# Patient Record
Sex: FEMALE | Race: BLACK OR AFRICAN AMERICAN | NOT HISPANIC OR LATINO | Employment: OTHER | URBAN - METROPOLITAN AREA
[De-identification: names, ages, dates, MRNs, and addresses within clinical notes are randomized per-mention and may not be internally consistent; named-entity substitution may affect disease eponyms.]

---

## 2017-08-08 PROBLEM — D06.9 CIN III (CERVICAL INTRAEPITHELIAL NEOPLASIA GRADE III) WITH SEVERE DYSPLASIA: Status: ACTIVE | Noted: 2017-08-08

## 2017-08-09 PROBLEM — Z98.890 S/P CONIZATION OF CERVIX: Status: ACTIVE | Noted: 2017-08-09

## 2017-08-22 PROBLEM — N72 CERVICITIS: Status: ACTIVE | Noted: 2017-08-22

## 2017-08-22 PROBLEM — N95.0 POSTMENOPAUSAL VAGINAL BLEEDING: Status: ACTIVE | Noted: 2017-08-22

## 2018-07-03 PROBLEM — Z01.419 WELL WOMAN EXAM: Status: ACTIVE | Noted: 2018-07-03

## 2022-05-13 ENCOUNTER — PATIENT OUTREACH (OUTPATIENT)
Dept: ADMINISTRATIVE | Facility: HOSPITAL | Age: 67
End: 2022-05-13

## 2022-06-19 ENCOUNTER — HOSPITAL ENCOUNTER (EMERGENCY)
Facility: HOSPITAL | Age: 67
Discharge: HOME | End: 2022-06-19
Attending: STUDENT IN AN ORGANIZED HEALTH CARE EDUCATION/TRAINING PROGRAM
Payer: MEDICARE

## 2022-06-19 VITALS
RESPIRATION RATE: 20 BRPM | BODY MASS INDEX: 20.06 KG/M2 | DIASTOLIC BLOOD PRESSURE: 68 MMHG | HEART RATE: 99 BPM | OXYGEN SATURATION: 95 % | TEMPERATURE: 97 F | SYSTOLIC BLOOD PRESSURE: 152 MMHG | WEIGHT: 128.06 LBS

## 2022-06-19 DIAGNOSIS — B34.9 VIRAL SYNDROME: Primary | ICD-10-CM

## 2022-06-19 LAB
GROUP A STREP, MOLECULAR: NEGATIVE
INFLUENZA A, MOLECULAR: NEGATIVE
INFLUENZA B, MOLECULAR: NEGATIVE
SARS-COV-2 RDRP RESP QL NAA+PROBE: NEGATIVE
SPECIMEN SOURCE: NORMAL

## 2022-06-19 PROCEDURE — 87502 INFLUENZA DNA AMP PROBE: CPT | Performed by: STUDENT IN AN ORGANIZED HEALTH CARE EDUCATION/TRAINING PROGRAM

## 2022-06-19 PROCEDURE — 96372 THER/PROPH/DIAG INJ SC/IM: CPT | Performed by: STUDENT IN AN ORGANIZED HEALTH CARE EDUCATION/TRAINING PROGRAM

## 2022-06-19 PROCEDURE — 87651 STREP A DNA AMP PROBE: CPT | Performed by: STUDENT IN AN ORGANIZED HEALTH CARE EDUCATION/TRAINING PROGRAM

## 2022-06-19 PROCEDURE — 99284 EMERGENCY DEPT VISIT MOD MDM: CPT | Mod: 25

## 2022-06-19 PROCEDURE — U0002 COVID-19 LAB TEST NON-CDC: HCPCS | Performed by: STUDENT IN AN ORGANIZED HEALTH CARE EDUCATION/TRAINING PROGRAM

## 2022-06-19 PROCEDURE — 63600175 PHARM REV CODE 636 W HCPCS: Performed by: STUDENT IN AN ORGANIZED HEALTH CARE EDUCATION/TRAINING PROGRAM

## 2022-06-19 RX ORDER — DEXAMETHASONE SODIUM PHOSPHATE 4 MG/ML
8 INJECTION, SOLUTION INTRA-ARTICULAR; INTRALESIONAL; INTRAMUSCULAR; INTRAVENOUS; SOFT TISSUE
Status: COMPLETED | OUTPATIENT
Start: 2022-06-19 | End: 2022-06-19

## 2022-06-19 RX ADMIN — DEXAMETHASONE SODIUM PHOSPHATE 8 MG: 4 INJECTION, SOLUTION INTRA-ARTICULAR; INTRALESIONAL; INTRAMUSCULAR; INTRAVENOUS; SOFT TISSUE at 11:06

## 2022-06-19 NOTE — ED PROVIDER NOTES
Encounter Date: 6/19/2022       History     Chief Complaint   Patient presents with    General Illness     66-year-old female with history of diabetes and hypertension presenting with three days of cough, congestion, body aches.  Denies fever, shortness of breath, chest pain.  No other complaints.        Review of patient's allergies indicates:   Allergen Reactions    Fig Rash     Past Medical History:   Diagnosis Date    Abnormal Pap smear of cervix 4/16/2015    ASCUS ,  Positive HPV    Arthritis     Cervicitis     CLAUDIA III (cervical intraepithelial neoplasia grade III) with severe dysplasia 8/8/2017    Diabetes mellitus     Diabetes mellitus type I     History of shingles     HPV test positive 5/24/2016    Hypertension     Toothache      Past Surgical History:   Procedure Laterality Date    CERVICAL CONIZATION N/A 08/09/2017    CERVIX LESION DESTRUCTION  08/09/2017    COLPOSCOPY  06/05/2015    MANDIBLE FRACTURE SURGERY Bilateral     TUBAL LIGATION       Family History   Problem Relation Age of Onset    Diabetes Mother     Hypertension Mother     Mental illness Mother     Cancer Father         colon cancer    Dementia Father      Social History     Tobacco Use    Smoking status: Never Smoker    Smokeless tobacco: Never Used   Substance Use Topics    Alcohol use: Yes     Comment: weekends drinks beer    Drug use: No     Review of Systems   Constitutional: Negative for fever.   HENT: Positive for congestion. Negative for sore throat.    Respiratory: Positive for cough. Negative for shortness of breath.    Cardiovascular: Negative for chest pain.   Gastrointestinal: Negative for abdominal pain, diarrhea, nausea and vomiting.   Genitourinary: Negative for dysuria.   Musculoskeletal: Positive for myalgias. Negative for back pain.   Skin: Negative for rash.   Neurological: Negative for weakness.   Hematological: Does not bruise/bleed easily.       Physical Exam     Initial Vitals [06/19/22 1053]    BP Pulse Resp Temp SpO2   (!) 152/68 99 20 97.3 °F (36.3 °C) 95 %      MAP       --         Physical Exam    Nursing note and vitals reviewed.  Constitutional: She appears well-developed.   HENT:   Head: Normocephalic.   Eyes: Pupils are equal, round, and reactive to light.   Neck:   Normal range of motion.  Cardiovascular:   No murmur heard.  Pulmonary/Chest: No respiratory distress.   Clear lungs bilaterally.  No respiratory distress.  No wheezing or rales.  Good air movement.     Abdominal: Abdomen is soft.   Musculoskeletal:         General: No edema.      Cervical back: Normal range of motion.     Neurological: She is alert.   Skin: Skin is warm.   Psychiatric: She has a normal mood and affect.         ED Course   Procedures  Labs Reviewed   INFLUENZA A & B BY MOLECULAR   GROUP A STREP, MOLECULAR   SARS-COV-2 RNA AMPLIFICATION, QUAL          Imaging Results    None          Medications - No data to display  Medical Decision Making:   Differential Diagnosis:   DDX:  Patient presenting with symptoms of an upper respiratory virus.  Concern for COVID, especially given recent surges in the current pandemic.  Unlikely pneumonia based on history, exam, vitals.  Do not suspect OM given sxs.  DX:  COVID.  Influenza.  Strep.  TX: Analgesia PRN. No indication for antibiotics.  Given patient is not hypoxic, no indications for steroids this time.  Dispo: Discharge to follow up with PMD within 3-5 days with precautions for RTED and supportive care recommendations.                        Clinical Impression:   Final diagnoses:  [B34.9] Viral syndrome (Primary)                 Jeremiah Davies MD  06/19/22 1051

## 2025-03-25 ENCOUNTER — TELEPHONE (OUTPATIENT)
Dept: TRANSPLANT | Facility: CLINIC | Age: 70
End: 2025-03-25
Payer: MEDICARE

## 2025-03-25 DIAGNOSIS — I50.9 CONGESTIVE HEART FAILURE, UNSPECIFIED HF CHRONICITY, UNSPECIFIED HEART FAILURE TYPE: Primary | ICD-10-CM

## 2025-05-14 ENCOUNTER — TELEPHONE (OUTPATIENT)
Dept: TRANSPLANT | Facility: CLINIC | Age: 70
End: 2025-05-14
Payer: MEDICARE

## 2025-05-14 NOTE — TELEPHONE ENCOUNTER
Called Patient to remind him//her of their appointment with Dr Weston on Friday 5/16/25 with/without tests. Patient confirmed.

## 2025-05-16 ENCOUNTER — OFFICE VISIT (OUTPATIENT)
Dept: TRANSPLANT | Facility: CLINIC | Age: 70
End: 2025-05-16
Payer: MEDICARE

## 2025-05-16 ENCOUNTER — HOSPITAL ENCOUNTER (OUTPATIENT)
Dept: CARDIOLOGY | Facility: HOSPITAL | Age: 70
Discharge: HOME | End: 2025-05-16
Attending: INTERNAL MEDICINE
Payer: MEDICARE

## 2025-05-16 VITALS
HEART RATE: 84 BPM | DIASTOLIC BLOOD PRESSURE: 76 MMHG | BODY MASS INDEX: 20.9 KG/M2 | HEIGHT: 67 IN | WEIGHT: 133.19 LBS | SYSTOLIC BLOOD PRESSURE: 136 MMHG

## 2025-05-16 VITALS
SYSTOLIC BLOOD PRESSURE: 141 MMHG | WEIGHT: 132.25 LBS | HEART RATE: 83 BPM | BODY MASS INDEX: 20.76 KG/M2 | DIASTOLIC BLOOD PRESSURE: 76 MMHG | HEIGHT: 67 IN

## 2025-05-16 DIAGNOSIS — I10 PRIMARY HYPERTENSION: Chronic | ICD-10-CM

## 2025-05-16 DIAGNOSIS — I50.9 CONGESTIVE HEART FAILURE, UNSPECIFIED HF CHRONICITY, UNSPECIFIED HEART FAILURE TYPE: ICD-10-CM

## 2025-05-16 DIAGNOSIS — I50.9 CONGESTIVE HEART FAILURE, UNSPECIFIED HF CHRONICITY, UNSPECIFIED HEART FAILURE TYPE: Primary | ICD-10-CM

## 2025-05-16 DIAGNOSIS — I50.42 CHRONIC COMBINED SYSTOLIC AND DIASTOLIC HEART FAILURE: ICD-10-CM

## 2025-05-16 LAB
AORTIC SIZE INDEX (SOV): 1.9 CM/M2
AORTIC SIZE INDEX: 1.6 CM/M2
ASCENDING AORTA: 2.7 CM
AV AREA BY CONTINUOUS VTI: 2.1 CM2
AV INDEX (PROSTH): 0.67
AV LVOT MEAN GRADIENT: 1 MMHG
AV LVOT PEAK GRADIENT: 1 MMHG
AV MEAN GRADIENT: 2 MMHG
AV PEAK GRADIENT: 3 MMHG
AV VALVE AREA BY VELOCITY RATIO: 1.7 CM²
AV VALVE AREA: 2.1 CM2
AV VELOCITY RATIO: 0.56
BSA FOR ECHO PROCEDURE: 1.68 M2
CV ECHO LV RWT: 0.29 CM
DOP CALC AO PEAK VEL: 0.9 M/S
DOP CALC AO VTI: 18 CM
DOP CALC LVOT AREA: 3.1 CM2
DOP CALC LVOT DIAMETER: 2 CM
DOP CALC LVOT PEAK VEL: 0.5 M/S
DOP CALC LVOT STROKE VOLUME: 37.7 CM3
DOP CALCLVOT PEAK VEL VTI: 12 CM
E WAVE DECELERATION TIME: 151 MS
E/A RATIO: 0.88
E/E' RATIO: 22 M/S
ECHO EF ESTIMATED: 22 %
ECHO LV POSTERIOR WALL: 0.9 CM (ref 0.6–1.1)
FRACTIONAL SHORTENING: 22.6 % (ref 28–44)
INTERVENTRICULAR SEPTUM: 1.1 CM (ref 0.6–1.1)
IVC DIAMETER: 1.66 CM
LA MAJOR: 6 CM
LA MINOR: 5.6 CM
LA WIDTH: 4.6 CM
LEFT ATRIUM SIZE: 4.4 CM
LEFT ATRIUM VOLUME INDEX MOD: 46 ML/M2
LEFT ATRIUM VOLUME INDEX: 59 ML/M2
LEFT ATRIUM VOLUME MOD: 78 ML
LEFT ATRIUM VOLUME: 100 CM3
LEFT INTERNAL DIMENSION IN SYSTOLE: 4.8 CM (ref 2.1–4)
LEFT VENTRICLE DIASTOLIC VOLUME INDEX: 81.07 ML/M2
LEFT VENTRICLE DIASTOLIC VOLUME: 137 ML
LEFT VENTRICLE MASS INDEX: 154.5 G/M2
LEFT VENTRICLE SYSTOLIC VOLUME INDEX: 62.7 ML/M2
LEFT VENTRICLE SYSTOLIC VOLUME: 106 ML
LEFT VENTRICULAR INTERNAL DIMENSION IN DIASTOLE: 6.2 CM (ref 3.5–6)
LEFT VENTRICULAR MASS: 261 G
LV LATERAL E/E' RATIO: 17.8
LV SEPTAL E/E' RATIO: 29.7
Lab: 18 %
MV PEAK A VEL: 1.01 M/S
MV PEAK E VEL: 0.89 M/S
OHS CV RV/LV RATIO: 0.56 CM
OHS LV EJECTION FRACTION SIMPSONS BIPLANE MOD: 25 %
PISA TR MAX VEL: 3.3 M/S
RA MAJOR: 5.83 CM
RA PRESSURE ESTIMATED: 3 MMHG
RA WIDTH: 4.54 CM
RIGHT ATRIAL AREA: 22.6 CM2
RIGHT VENTRICLE DIASTOLIC BASEL DIMENSION: 3.5 CM
RIGHT VENTRICLE FREE WALL STRAIN: 10 %
RIGHT VENTRICLE GLOBAL SYSTOLIC STRAIN: 10 %
RV FRACTIONAL AREA CHANGE: 18 %
RV TB RVSP: 6 MMHG
RV TISSUE DOPPLER FREE WALL SYSTOLIC VELOCITY 1 (APICAL 4 CHAMBER VIEW): 7.76 CM/S
SINUS: 3.13 CM
STJ: 2.3 CM
TDI LATERAL: 0.05 M/S
TDI SEPTAL: 0.03 M/S
TDI: 0.04 M/S
TRICUSPID ANNULAR PLANE SYSTOLIC EXCURSION: 0.9 CM
TV PEAK GRADIENT: 43 MMHG
TV REST PULMONARY ARTERY PRESSURE: 47 MMHG
Z-SCORE OF LEFT VENTRICULAR DIMENSION IN END DIASTOLE: 2.76
Z-SCORE OF LEFT VENTRICULAR DIMENSION IN END SYSTOLE: 3.94

## 2025-05-16 PROCEDURE — 93306 TTE W/DOPPLER COMPLETE: CPT | Mod: 26,,, | Performed by: INTERNAL MEDICINE

## 2025-05-16 PROCEDURE — 93306 TTE W/DOPPLER COMPLETE: CPT

## 2025-05-16 PROCEDURE — 99999 PR PBB SHADOW E&M-EST. PATIENT-LVL IV: CPT | Mod: PBBFAC,,, | Performed by: INTERNAL MEDICINE

## 2025-05-16 RX ORDER — TIRZEPATIDE 2.5 MG/.5ML
INJECTION, SOLUTION SUBCUTANEOUS
COMMUNITY

## 2025-05-16 RX ORDER — SACUBITRIL AND VALSARTAN 49; 51 MG/1; MG/1
1 TABLET, FILM COATED ORAL 2 TIMES DAILY
Qty: 60 TABLET | Refills: 5 | Status: SHIPPED | OUTPATIENT
Start: 2025-05-16

## 2025-05-16 RX ORDER — SACUBITRIL AND VALSARTAN 24; 26 MG/1; MG/1
1 TABLET, FILM COATED ORAL 2 TIMES DAILY
COMMUNITY
Start: 2025-01-07 | End: 2025-05-16

## 2025-05-16 RX ORDER — FAMOTIDINE 20 MG/1
20 TABLET, FILM COATED ORAL 2 TIMES DAILY
COMMUNITY
Start: 2024-12-30

## 2025-05-16 RX ORDER — METOPROLOL SUCCINATE 100 MG/1
100 TABLET, EXTENDED RELEASE ORAL 2 TIMES DAILY
COMMUNITY
Start: 2025-05-13 | End: 2025-05-16

## 2025-05-16 RX ORDER — FUROSEMIDE 20 MG/1
20 TABLET ORAL EVERY OTHER DAY
Qty: 30 TABLET | Refills: 11 | Status: SHIPPED | OUTPATIENT
Start: 2025-05-16 | End: 2027-05-06

## 2025-05-16 RX ORDER — METOPROLOL SUCCINATE 200 MG/1
200 TABLET, EXTENDED RELEASE ORAL DAILY
Qty: 90 TABLET | Refills: 3 | Status: SHIPPED | OUTPATIENT
Start: 2025-05-16 | End: 2026-05-16

## 2025-05-16 RX ORDER — SPIRONOLACTONE 25 MG/1
25 TABLET ORAL DAILY
Qty: 30 TABLET | Refills: 5 | Status: SHIPPED | OUTPATIENT
Start: 2025-05-16

## 2025-05-16 RX ORDER — PRAVASTATIN SODIUM 40 MG/1
40 TABLET ORAL NIGHTLY
COMMUNITY

## 2025-05-16 RX ORDER — CHOLECALCIFEROL (VITAMIN D3) 25 MCG
1000 TABLET ORAL DAILY
COMMUNITY

## 2025-05-16 RX ORDER — SPIRONOLACTONE 25 MG/1
12.5 TABLET ORAL DAILY
COMMUNITY
Start: 2025-03-14 | End: 2025-05-16 | Stop reason: SDUPTHER

## 2025-05-16 RX ORDER — BUMETANIDE 1 MG/1
1 TABLET ORAL
COMMUNITY
Start: 2025-02-28 | End: 2025-05-16

## 2025-05-16 NOTE — PATIENT INSTRUCTIONS
You have extra fluid on you.  Please adhere to a low sodium diet (no more than 1.5 grams of sodium in 24h).  3.   Follow fluid restriction of  1. no more than 2 liters in 24 hours..   4.  Increase spironolactone from 12.5 to 25 mg daily.  5. Start lasix 20 mg every other day.  6. F/u blood test Wednesday am.  8. In 2 weeks from now, increase entresto from low dose to medium dose.  9. Call us every 2 weeks for next upgrade on medications.  10. Metoprolol: take 200 mg daily instead of 100 mg BID.  11. F/u in 3 months with labs.

## 2025-05-16 NOTE — PROGRESS NOTES
[facility] Follow up.        Attending Physician: Watson Pa MD.  The patient's last visit with me was on Visit date not found.         HPI.  70 yo woman with PMH/o:  -HTN  -Diabetes on insulin x 15 years, but taken off of it. Currently on mounjaro.  -dyslipidemia on pravastatin  -Neuropathy and RLS    No history of CAD. Reportedly she underwent LHC in OSH that found no CAD.    SH  Never tobacco, alcohol mild intake on special days. No illicit drugs.  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  Lives by herself with her dog.    Last year she had 5 deaths in the family. That is when her heart broke and started having dyspnea, swelling (December 2024). She lost her son.    Today she got very tearful after remembering the dead of her son.           Review of Systems   Constitutional:  Positive for activity change and fatigue. Negative for chills, diaphoresis, fever and unexpected weight change.   HENT:  Negative for nasal congestion, rhinorrhea and sore throat.    Eyes:  Negative for visual disturbance.   Respiratory:  Positive for shortness of breath.    Cardiovascular:  Negative for chest pain and leg swelling.   Gastrointestinal:  Negative for abdominal distention, abdominal pain, diarrhea, nausea and vomiting.   Genitourinary:  Negative for difficulty urinating, dysuria and hematuria.   Integumentary:  Negative for rash.   Neurological:  Negative for seizures, syncope and light-headedness.   Psychiatric/Behavioral:  Negative for agitation and hallucinations.         Past Medical History:   Diagnosis Date    Abnormal Pap smear of cervix 4/16/2015    ASCUS ,  Positive HPV    Arthritis     Cervicitis     CLAUDIA III (cervical intraepithelial neoplasia grade III) with severe dysplasia 8/8/2017    Diabetes mellitus     Diabetes mellitus type I     History  "of shingles     HPV test positive 2016    Hypertension     Toothache         Past Surgical History:   Procedure Laterality Date    CERVICAL CONIZATION N/A 2017    CERVIX LESION DESTRUCTION  2017    COLPOSCOPY  2015    MANDIBLE FRACTURE SURGERY Bilateral     TUBAL LIGATION          Family History   Problem Relation Name Age of Onset    Diabetes Mother      Hypertension Mother      Mental illness Mother      Cancer Father          colon cancer    Dementia Father          Review of patient's allergies indicates:   Allergen Reactions    Fig Rash        Current Outpatient Medications   Medication Instructions    aspirin (ECOTRIN) 81 mg, Daily    BD ULTRA-FINE MINI PEN NEEDLE 31 gauge x 3/16" Ndle use as directed    bumetanide (BUMEX) 1 mg, As needed (PRN)    ENTRESTO 24-26 mg per tablet 1 tablet, 2 times daily    famotidine (PEPCID) 20 mg, 2 times daily    GVOKE HYPOPEN 2-PACK 1 mg/0.2 mL AtIn INJECT 1MG SUB-Q ONCE FOR HYPOGLYCEMIC SYMPTOMS AS A SINGLE DOSE. MAY REPEAT ONCE AFTER 15 MINUTES IF NO RESPONSE    loratadine (CLARITIN) 10 mg, Daily    metoprolol succinate (TOPROL-XL) 100 mg, 2 times daily    MOUNJARO 2.5 mg/0.5 mL PnIj SMARTSI.5 Milligram(s) SUB-Q Once a Week    pravastatin (PRAVACHOL) 40 mg, Nightly    spironolactone (ALDACTONE) 12.5 mg, Daily    TRESIBA FLEXTOUCH U-100 100 unit/mL (3 mL) insulin pen No dose, route, or frequency recorded.    vitamin D (VITAMIN D3) 1,000 Units, Daily        Vitals:    25 0917   BP: 136/76   Pulse: 84        Wt Readings from Last 3 Encounters:   25 60.4 kg (133 lb 2.5 oz)   25 60 kg (132 lb 4.4 oz)   24 60.2 kg (132 lb 11.5 oz)     Temp Readings from Last 3 Encounters:   24 97 °F (36.1 °C) (Temporal)   22 97.7 °F (36.5 °C) (Oral)   22 97.5 °F (36.4 °C) (Oral)     BP Readings from Last 3 Encounters:   25 136/76   25 (!) 141/76   24 (!) 150/67     Pulse Readings from Last 3 Encounters: " "  05/16/25 84   05/16/25 83   07/30/24 69        Body mass index is 20.86 kg/m². Estimated body surface area is 1.69 meters squared as calculated from the following:    Height as of this encounter: 5' 7" (1.702 m).    Weight as of this encounter: 60.4 kg (133 lb 2.5 oz).     Physical Exam  Constitutional:       Appearance: She is well-developed.   HENT:      Head: Normocephalic and atraumatic.      Right Ear: External ear normal.      Left Ear: External ear normal.   Eyes:      Conjunctiva/sclera: Conjunctivae normal.      Pupils: Pupils are equal, round, and reactive to light.   Neck:      Vascular: Hepatojugular reflux and JVD present.      Comments: JVP 15 cmH20  Cardiovascular:      Rate and Rhythm: Normal rate and regular rhythm.      Pulses: Intact distal pulses.      Heart sounds: S1 normal and S2 normal. No murmur heard.     No friction rub. No gallop.   Pulmonary:      Effort: Pulmonary effort is normal.      Breath sounds: Normal breath sounds.   Abdominal:      General: Bowel sounds are normal. There is no distension.      Palpations: Abdomen is soft.      Tenderness: There is no abdominal tenderness. There is no guarding or rebound.   Musculoskeletal:      Cervical back: Normal range of motion and neck supple.      Right lower leg: No edema.      Left lower leg: No edema.   Neurological:      Mental Status: She is alert and oriented to person, place, and time.          Lab Results   Component Value Date    BNP 3,117 (H) 05/16/2025     08/09/2017    K 3.6 08/09/2017     08/09/2017    CO2 26 08/09/2017    BUN 14 08/09/2017    CREATININE 0.7 08/09/2017     (H) 08/09/2017    HGBA1C 9.8 (H) 07/03/2018    WBC 15.23 (H) 05/16/2025    HGB 14.9 05/16/2025    HGB 13.3 07/18/2017    HCT 51.2 (H) 05/16/2025    HCT 42.4 07/18/2017     (H) 05/16/2025     (H) 07/18/2017    CHOL 158 07/03/2018    HDL 32 (L) 07/03/2018    LDLCALC 95.6 07/03/2018    TRIG 152 (H) 07/03/2018 "           Assessment and Plan:  There are no diagnoses linked to this encounter.      Chronic systolic HF, NYHA class III, stage C. LVEF 25-30%.  Etiology: NICM, negative LHC. Hypertensive heart disease vs idiopathic.  Devices: none, she was carrying a lifevest.  Medications: low dose sacubitril-valsartan, metoprolol succinate 100 mg BID, spironolactone 12.5 mg daily.  Hemodynamic status: warm, normotensive, hypervolemic.  Clinical course: new diagnosis, still in the process of uptitrating her medical regimen.  Plan:  -patient to adhere to a fluid restriction of NMT 1.5 liters/24h.  -patient to adhere to  low sodium diet, NMT 1.5 grams of sodium in a day.  -patient was asked to weight himself every day and to keep daily record of his weights.  -Start lasix 20 mg every other day.  -Increase spironolactone from 12.5 to 25 mg daily.   -F/u blood test Wednesday am.  -In 2 weeks from now, increase entresto from low dose to medium dose.  -patient was asked to call us every 2 weeks for next upgrade on medications.  -Metoprolol: switched to 200 mg daily instead of 100 mg BID.    F/u in 3 months with labs.

## 2025-05-21 ENCOUNTER — TELEPHONE (OUTPATIENT)
Dept: TRANSPLANT | Facility: CLINIC | Age: 70
End: 2025-05-21
Payer: MEDICARE

## 2025-05-21 NOTE — TELEPHONE ENCOUNTER
5/21/25  5:10 pm: F/U on todays lab remind me  Below is from Dr. Galdamez 5/16 clinic note w/ indication for lab today:  4.  Increase spironolactone from 12.5 to 25 mg daily.  5. Start lasix 20 mg every other day.  6. F/u blood test Wednesday am.  8. In 2 weeks from now, increase entresto from low dose to medium dose.  9. Call us every 2 weeks for next upgrade on medications.  10. Metoprolol: take 200 mg daily instead of 100 mg BID.    Received outside lab results today from [facility] collection:  Na/k:  137/4.8   cl/Co2:  107/28.4   Bun/Cr:  36/1.5 Ca 8.5  NtproBNP:  55384    Called pt at this time:  TROY KRAMER asking pt to return my call, whom I am ., w/ Dr. Galdamez office and calling to review her lab results and also to see how she is feeling and review her medications  Left day, date, time and this office call back number   Asked for a return call   Will follow tomorrow     5/23/25 9:00 am:  Called pt to review the above  Pt said she did hear my message  Told pt reason for call and office I am w/ Reviewed some lab results w/ pt from yesterday after asking pt how she is doing and she told me good    Asked pt about med changes   Pt said she did increase Spironolactone from 1/2 pill to a full pill on Monday 5/19  Asked if she started Lasix 20 mg every other day and she told me she did on Monday 5/19, then said she was aleaDY TAKING THIS medicine  Per Naheed chaidez and her medication record in epic it looked like it was new    Assessment:  Reports has some fluid in her abdomen, not in her ankles  Wt usually 125-128  Past week 128-131  breathing ok    Pt then unsure of meds, dosing, etc  Stating she was not home to check to make sure  Told pt that was ok, understood and pt not sure when she will be  home, therefore, asked pt to please call me once home so we can review the medicines for certainty  Pt agreed to this Asked pt if she had this office phone number explaining it is not what is showing on her phone display at  "this time and that I could provide it to her-she declined and stated, " I will call you back "         "

## 2025-05-22 ENCOUNTER — TELEPHONE (OUTPATIENT)
Dept: TRANSPLANT | Facility: CLINIC | Age: 70
End: 2025-05-22
Payer: MEDICARE

## 2025-05-22 NOTE — TELEPHONE ENCOUNTER
5/22/25 - Received below written orders from PARMINDER Pa M.D.    ----- Message from Watson Pa MD sent at 5/22/2025  3:17 PM CDT -----  Basically, she is doing what she wants to do. She was not even in a diuretic when she saw me.I still suggest to start spironolactone 25 mg daily.Otherwise, she can follow up with referring or local cardiologist.thx    Called/spoke with patient and instructed her to start taking spironolactone 25 mg daily and to take metoprolol 200 mg once a day not 100 mg twice a day.  Patient stated she will start taking both as stated above.  Patient is going to continue taking lasix 20 mg BID, but keeps insisting it does not work and stated she is tired of wearing her Life Vest and she was suppose to have a pacemaker tomorrow to get rid of the Life vest but she can't now.  Patient stated she was sent here for us to get the test done so she could get her pacemaker. I instructed patient she needs to follow the recommendations of Dr. Trista Pa who is trying to get her optimized on her heart failure medications and it takes time.  Patient got frustrated and said she has been through all of this and has been on the medications since December.  I reiterated to patient she needs to follow the plan Dr. Trista Pa, discussed with her, at her visit.  Patient wanted to get off of the phone and end the conversation.      ----- Message -----  From: Kirstin Mckinney RN  Sent: 5/22/2025   2:50 PM CDT  To: MD Watson Fabian,    Anthony saw this patient in clinic 5/16 and instructed the patient of the following plan:4.  Increase spironolactone from 12.5 to 25 mg daily.5. Start lasix 20 mg every other day.6. F/u blood test Wednesday am.8. In 2 weeks from now, increase entresto from low dose to medium dose.9. Call us every 2 weeks for next upgrade on medications.10. Metoprolol: take 200 mg daily instead of 100 mg BID.     She had labs drawn yesterday, which NATTY Santiago RN received.  She  was unable to reach patient yesterday, for assessment.    Received outside lab results today from todays collection:Na/k:  137/4.8   cl/Co2:  107/28.4   Bun/Cr:  36/1.5 Ca 8.5  NtproBNP:  37680    Spoke with patient and asked if she increased her spironolactone to 25 mg daily, patient stated she was taken off of spironolactone and did not restart it because she the pharmacy didn't give it to her.  I asked again and confirmed patient was not even taking spironolactone 12.5 mg daily.  She wasn't taking it at all.  Then I asked about Lasix if she was taking Lasix 20 mg every other day and patient stated that is a problem for her.  Patient states she is building up with fluid so fast, she stated she was taking lasix 20 mg BID, every day.  Patient states Lasix is not working for her she said bumetanide works better.  I asked about metoprolol and asked her if she changed to taking 200 mg daily instead of 100 mg BID.  She has not, but did  the new prescription.Please let me know what you would like to do.  Patient is insisting the Lasix does not work.    Sincerely,  Kirstin

## 2025-07-02 PROBLEM — I42.8 NONISCHEMIC CARDIOMYOPATHY: Status: ACTIVE | Noted: 2025-07-02

## 2025-08-06 ENCOUNTER — TELEPHONE (OUTPATIENT)
Dept: TRANSPLANT | Facility: CLINIC | Age: 70
End: 2025-08-06
Payer: MEDICARE